# Patient Record
Sex: FEMALE
[De-identification: names, ages, dates, MRNs, and addresses within clinical notes are randomized per-mention and may not be internally consistent; named-entity substitution may affect disease eponyms.]

---

## 2023-03-31 ENCOUNTER — HOSPITAL ENCOUNTER (OUTPATIENT)
Age: 23
End: 2023-03-31
Payer: OTHER GOVERNMENT

## 2023-03-31 DIAGNOSIS — Z34.01: Primary | ICD-10-CM

## 2023-03-31 LAB
ADD MANUAL DIFF / SLIDE REVIEW: NO
HEMATOCRIT: 37.3 % (ref 36–46)
HEMOGLOBIN: 12.6 G/DL (ref 12–16)
LYMPHOCYTES # SPEC AUTO: 3500 /UL (ref 1100–4500)
MCV RBC: 88.5 FL (ref 80–100)
MEAN CORPUSCULAR HEMOGLOBIN: 29.9 PG (ref 26–34)
MEAN CORPUSCULAR HGB CONC: 33.7 % (ref 30–36)
PLATELET COUNT: 204 X10^3/UL (ref 150–400)

## 2023-03-31 PROCEDURE — 86850 RBC ANTIBODY SCREEN: CPT

## 2023-03-31 PROCEDURE — 86901 BLOOD TYPING SEROLOGIC RH(D): CPT

## 2023-03-31 PROCEDURE — 86787 VARICELLA-ZOSTER ANTIBODY: CPT

## 2023-03-31 PROCEDURE — 87086 URINE CULTURE/COLONY COUNT: CPT

## 2023-03-31 PROCEDURE — 36415 COLL VENOUS BLD VENIPUNCTURE: CPT

## 2023-03-31 PROCEDURE — 86803 HEPATITIS C AB TEST: CPT

## 2023-03-31 PROCEDURE — 86900 BLOOD TYPING SEROLOGIC ABO: CPT

## 2023-03-31 PROCEDURE — 87389 HIV-1 AG W/HIV-1&-2 AB AG IA: CPT

## 2023-04-03 LAB
HBV SURFACE AG SERPL QL IA: NEGATIVE S/C
HCV AB SERPL QL IA: NEGATIVE S/C
HIV 1+2 AB+HIV1 P24 AG SERPL QL IA: NEGATIVE

## 2023-05-01 ENCOUNTER — HOSPITAL ENCOUNTER (OUTPATIENT)
Age: 23
End: 2023-05-01
Payer: OTHER GOVERNMENT

## 2023-05-01 DIAGNOSIS — Z3A.15: ICD-10-CM

## 2023-05-01 DIAGNOSIS — Z34.02: Primary | ICD-10-CM

## 2023-05-01 LAB
C TRACH DNA UR QL NAA+PROBE: NOT DETECTED
N GONORRHOEA RRNA UR QL NAA+PROBE: NOT DETECTED

## 2023-05-01 PROCEDURE — 87491 CHLMYD TRACH DNA AMP PROBE: CPT

## 2023-05-01 PROCEDURE — 87591 N.GONORRHOEAE DNA AMP PROB: CPT

## 2023-05-31 ENCOUNTER — HOSPITAL ENCOUNTER (OUTPATIENT)
Age: 23
End: 2023-05-31
Payer: OTHER GOVERNMENT

## 2023-05-31 DIAGNOSIS — Z3A.19: ICD-10-CM

## 2023-05-31 DIAGNOSIS — Z34.02: Primary | ICD-10-CM

## 2023-05-31 PROCEDURE — 36415 COLL VENOUS BLD VENIPUNCTURE: CPT

## 2023-05-31 PROCEDURE — 82677 ASSAY OF ESTRIOL: CPT

## 2023-05-31 PROCEDURE — 84702 CHORIONIC GONADOTROPIN TEST: CPT

## 2023-05-31 PROCEDURE — 86336 INHIBIN A: CPT

## 2023-05-31 PROCEDURE — 82105 ALPHA-FETOPROTEIN SERUM: CPT

## 2023-06-02 LAB
AFP SERPL-MCNC: 37.4 NG/ML
GA METHOD: (no result)
HCG ADJ MOM SERPL: 1.23
HCG SERPL-ACNC: (no result) MIU/ML
INHIBIN A ADJ MOM SERPL: 1.56
INHIBIN A SERPL-MCNC: 209.15 PG/ML
U ESTRIOL SERPL-MCNC: 1.42 NG/ML

## 2023-06-06 ENCOUNTER — HOSPITAL ENCOUNTER (OUTPATIENT)
Age: 23
End: 2023-06-06
Payer: OTHER GOVERNMENT

## 2023-06-06 DIAGNOSIS — Z3A.20: ICD-10-CM

## 2023-06-06 DIAGNOSIS — Z34.02: Primary | ICD-10-CM

## 2023-06-06 PROCEDURE — 76811 OB US DETAILED SNGL FETUS: CPT

## 2023-06-25 ENCOUNTER — HOSPITAL ENCOUNTER (OUTPATIENT)
Dept: HOSPITAL 73 - LABOR | Age: 23
Discharge: HOME | End: 2023-06-25
Payer: OTHER GOVERNMENT

## 2023-06-25 DIAGNOSIS — O36.8120: Primary | ICD-10-CM

## 2023-06-25 DIAGNOSIS — Z3A.23: ICD-10-CM

## 2023-06-25 PROCEDURE — G0378 HOSPITAL OBSERVATION PER HR: HCPCS

## 2023-06-25 PROCEDURE — 59025 FETAL NON-STRESS TEST: CPT

## 2023-06-25 PROCEDURE — G0379 DIRECT REFER HOSPITAL OBSERV: HCPCS

## 2023-07-28 ENCOUNTER — HOSPITAL ENCOUNTER (OUTPATIENT)
Age: 23
End: 2023-07-28
Payer: OTHER GOVERNMENT

## 2023-07-28 DIAGNOSIS — Z3A.26: ICD-10-CM

## 2023-07-28 DIAGNOSIS — Z34.02: Primary | ICD-10-CM

## 2023-07-28 LAB
GTT (PREG) 1 HOUR PP 50GM DOSE: 116 MG/DL (ref 76–139)
HEMATOCRIT: 33.8 % (ref 36–46)
HEMOGLOBIN: 11.4 G/DL (ref 12–16)

## 2023-07-28 PROCEDURE — 36415 COLL VENOUS BLD VENIPUNCTURE: CPT

## 2023-07-28 PROCEDURE — 85014 HEMATOCRIT: CPT

## 2023-07-28 PROCEDURE — 82950 GLUCOSE TEST: CPT

## 2023-07-28 PROCEDURE — 85018 HEMOGLOBIN: CPT

## 2023-10-05 ENCOUNTER — HOSPITAL ENCOUNTER (OUTPATIENT)
Age: 23
End: 2023-10-05
Payer: OTHER GOVERNMENT

## 2023-10-05 DIAGNOSIS — Z34.03: Primary | ICD-10-CM

## 2023-10-05 PROCEDURE — 87653 STREP B DNA AMP PROBE: CPT

## 2023-10-06 LAB — GP B STREP DNA SPEC QL NAA+PROBE: (no result)

## 2023-10-15 ENCOUNTER — HOSPITAL ENCOUNTER (EMERGENCY)
Age: 23
Discharge: HOME | End: 2023-10-15
Payer: OTHER GOVERNMENT

## 2023-10-15 VITALS
HEART RATE: 106 BPM | DIASTOLIC BLOOD PRESSURE: 65 MMHG | RESPIRATION RATE: 18 BRPM | SYSTOLIC BLOOD PRESSURE: 126 MMHG | OXYGEN SATURATION: 99 %

## 2023-10-15 VITALS
OXYGEN SATURATION: 98 % | HEART RATE: 113 BPM | DIASTOLIC BLOOD PRESSURE: 83 MMHG | SYSTOLIC BLOOD PRESSURE: 135 MMHG | RESPIRATION RATE: 18 BRPM | TEMPERATURE: 98.2 F

## 2023-10-15 VITALS — BODY MASS INDEX: 43.3 KG/M2

## 2023-10-15 DIAGNOSIS — R09.A0: Primary | ICD-10-CM

## 2023-10-15 PROCEDURE — A9270 NON-COVERED ITEM OR SERVICE: HCPCS

## 2023-10-15 PROCEDURE — 99283 EMERGENCY DEPT VISIT LOW MDM: CPT

## 2023-10-19 ENCOUNTER — HOSPITAL ENCOUNTER (INPATIENT)
Age: 23
LOS: 4 days | Discharge: HOME | End: 2023-10-23
Payer: OTHER GOVERNMENT

## 2023-10-19 VITALS — BODY MASS INDEX: 43 KG/M2

## 2023-10-19 VITALS — SYSTOLIC BLOOD PRESSURE: 109 MMHG | DIASTOLIC BLOOD PRESSURE: 71 MMHG

## 2023-10-19 DIAGNOSIS — Z3A.39: ICD-10-CM

## 2023-10-19 DIAGNOSIS — T88.59XA: ICD-10-CM

## 2023-10-19 DIAGNOSIS — D62: ICD-10-CM

## 2023-10-19 DIAGNOSIS — O43.893: ICD-10-CM

## 2023-10-19 DIAGNOSIS — O41.03X0: ICD-10-CM

## 2023-10-19 DIAGNOSIS — G44.40: ICD-10-CM

## 2023-10-19 DIAGNOSIS — O61.0: Primary | ICD-10-CM

## 2023-10-19 LAB
ADD MANUAL DIFF / SLIDE REVIEW: NO
HEMATOCRIT: 34.6 % (ref 36–46)
HEMOGLOBIN: 11.5 G/DL (ref 12–16)
LYMPHOCYTES # SPEC AUTO: 2100 /UL (ref 1100–4500)
MCV RBC: 87.7 FL (ref 80–100)
MEAN CORPUSCULAR HEMOGLOBIN: 29.2 PG (ref 26–34)
MEAN CORPUSCULAR HGB CONC: 33.3 % (ref 30–36)
PLATELET COUNT: 175 X10^3/UL (ref 150–400)

## 2023-10-19 PROCEDURE — 86900 BLOOD TYPING SEROLOGIC ABO: CPT

## 2023-10-19 PROCEDURE — G0379 DIRECT REFER HOSPITAL OBSERV: HCPCS

## 2023-10-19 PROCEDURE — 85025 COMPLETE CBC W/AUTO DIFF WBC: CPT

## 2023-10-19 PROCEDURE — 62273 INJECT EPIDURAL PATCH: CPT

## 2023-10-19 PROCEDURE — 86850 RBC ANTIBODY SCREEN: CPT

## 2023-10-19 PROCEDURE — 59200 INSERT CERVICAL DILATOR: CPT

## 2023-10-19 PROCEDURE — 86901 BLOOD TYPING SEROLOGIC RH(D): CPT

## 2023-10-19 PROCEDURE — 59050 FETAL MONITOR W/REPORT: CPT

## 2023-10-19 PROCEDURE — 76815 OB US LIMITED FETUS(S): CPT

## 2023-10-19 PROCEDURE — 59510 CESAREAN DELIVERY: CPT

## 2023-10-19 PROCEDURE — 59514 CESAREAN DELIVERY ONLY: CPT

## 2023-10-19 PROCEDURE — 36415 COLL VENOUS BLD VENIPUNCTURE: CPT

## 2023-10-21 VITALS
DIASTOLIC BLOOD PRESSURE: 92 MMHG | RESPIRATION RATE: 22 BRPM | OXYGEN SATURATION: 97 % | HEART RATE: 105 BPM | SYSTOLIC BLOOD PRESSURE: 132 MMHG

## 2023-10-21 VITALS
SYSTOLIC BLOOD PRESSURE: 117 MMHG | DIASTOLIC BLOOD PRESSURE: 73 MMHG | RESPIRATION RATE: 22 BRPM | HEART RATE: 107 BPM | OXYGEN SATURATION: 97 %

## 2023-10-21 VITALS
DIASTOLIC BLOOD PRESSURE: 83 MMHG | HEART RATE: 96 BPM | RESPIRATION RATE: 20 BRPM | TEMPERATURE: 97.5 F | SYSTOLIC BLOOD PRESSURE: 136 MMHG | OXYGEN SATURATION: 98 %

## 2023-10-21 VITALS
OXYGEN SATURATION: 98 % | DIASTOLIC BLOOD PRESSURE: 81 MMHG | TEMPERATURE: 97.3 F | HEART RATE: 104 BPM | SYSTOLIC BLOOD PRESSURE: 122 MMHG | RESPIRATION RATE: 16 BRPM

## 2023-10-21 VITALS
SYSTOLIC BLOOD PRESSURE: 122 MMHG | DIASTOLIC BLOOD PRESSURE: 83 MMHG | OXYGEN SATURATION: 98 % | HEART RATE: 107 BPM | RESPIRATION RATE: 19 BRPM

## 2023-10-22 LAB
ADD MANUAL DIFF / SLIDE REVIEW: NO
HEMATOCRIT: 26.9 % (ref 36–46)
HEMOGLOBIN: 9.1 G/DL (ref 12–16)
LYMPHOCYTES # SPEC AUTO: 1200 /UL (ref 1100–4500)
MCV RBC: 87.8 FL (ref 80–100)
MEAN CORPUSCULAR HEMOGLOBIN: 29.6 PG (ref 26–34)
MEAN CORPUSCULAR HGB CONC: 33.7 % (ref 30–36)
PLATELET COUNT: 131 X10^3/UL (ref 150–400)

## 2023-10-23 VITALS
TEMPERATURE: 97.3 F | SYSTOLIC BLOOD PRESSURE: 105 MMHG | HEART RATE: 98 BPM | OXYGEN SATURATION: 97 % | DIASTOLIC BLOOD PRESSURE: 67 MMHG | RESPIRATION RATE: 26 BRPM

## 2023-10-23 PROCEDURE — 3E0R3GC INTRODUCTION OF OTHER THERAPEUTIC SUBSTANCE INTO SPINAL CANAL, PERCUTANEOUS APPROACH: ICD-10-PCS | Performed by: ANESTHESIOLOGY

## 2024-03-05 ENCOUNTER — HOSPITAL ENCOUNTER (INPATIENT)
Dept: HOSPITAL 73 - ED | Age: 24
LOS: 4 days | Discharge: ANOTHER HOSPITAL | DRG: 419 | End: 2024-03-09
Payer: OTHER GOVERNMENT

## 2024-03-05 VITALS
TEMPERATURE: 98.4 F | SYSTOLIC BLOOD PRESSURE: 127 MMHG | OXYGEN SATURATION: 97 % | RESPIRATION RATE: 18 BRPM | DIASTOLIC BLOOD PRESSURE: 91 MMHG | HEART RATE: 84 BPM

## 2024-03-05 VITALS — HEART RATE: 71 BPM

## 2024-03-05 VITALS
RESPIRATION RATE: 19 BRPM | HEART RATE: 71 BPM | DIASTOLIC BLOOD PRESSURE: 90 MMHG | OXYGEN SATURATION: 98 % | SYSTOLIC BLOOD PRESSURE: 139 MMHG

## 2024-03-05 VITALS — BODY MASS INDEX: 38.7 KG/M2 | BODY MASS INDEX: 39.6 KG/M2

## 2024-03-05 DIAGNOSIS — Z87.891: ICD-10-CM

## 2024-03-05 DIAGNOSIS — K80.20: Primary | ICD-10-CM

## 2024-03-05 LAB
ADD MANUAL DIFF / SLIDE REVIEW: NO
ALBUMIN SERPL-MCNC: 4.2 G/DL (ref 3.5–5)
ALBUMIN/GLOB SERPL: 1.2 {RATIO} (ref 1–2.8)
ALP SERPL-CCNC: 116 U/L (ref 38–126)
ALT SERPL-CCNC: 566 IU/L (ref ?–35)
BUN SERPL-MCNC: 11 MG/DL (ref 7–17)
CALCIUM SERPL-MCNC: 10 MG/DL (ref 8.4–10.2)
CHLORIDE SERPL-SCNC: 105 MMOL/L (ref 98–107)
CO2 SERPL-SCNC: 31 MMOL/L (ref 22–32)
ESTIMATED GLOMERULAR FILT RATE: > 60 ML/MIN (ref 60–?)
GLOBULIN SER CALC-MCNC: 3.5 G/DL (ref 1.7–4.1)
GLUCOSE SERPL-MCNC: 105 MG/DL (ref 70–100)
HEMATOCRIT: 40.5 % (ref 36–46)
HEMOGLOBIN: 13.4 G/DL (ref 12–16)
HEMOLYSIS: < 15 (ref 0–50)
LIPASE SERPL-CCNC: 183 U/L (ref 23–300)
LYMPHOCYTES # SPEC AUTO: 1500 /UL (ref 1100–4500)
MCV RBC: 86.3 FL (ref 80–100)
MEAN CORPUSCULAR HEMOGLOBIN: 28.5 PG (ref 26–34)
MEAN CORPUSCULAR HGB CONC: 33 % (ref 30–36)
PLATELET COUNT: 201 X10^3/UL (ref 150–400)
POTASSIUM SERPL-SCNC: 4 MMOL/L (ref 3.4–5.1)
PROT SERPL-MCNC: 7.7 G/DL (ref 6.3–8.2)
SODIUM SERPL-SCNC: 140 MMOL/L (ref 137–145)

## 2024-03-05 PROCEDURE — 83690 ASSAY OF LIPASE: CPT

## 2024-03-05 PROCEDURE — 74183 MRI ABD W/O CNTR FLWD CNTR: CPT

## 2024-03-05 PROCEDURE — 47563 LAPARO CHOLECYSTECTOMY/GRAPH: CPT

## 2024-03-05 PROCEDURE — 96374 THER/PROPH/DIAG INJ IV PUSH: CPT

## 2024-03-05 PROCEDURE — 99222 1ST HOSP IP/OBS MODERATE 55: CPT

## 2024-03-05 PROCEDURE — 36415 COLL VENOUS BLD VENIPUNCTURE: CPT

## 2024-03-05 PROCEDURE — 99284 EMERGENCY DEPT VISIT MOD MDM: CPT

## 2024-03-05 PROCEDURE — 81025 URINE PREGNANCY TEST: CPT

## 2024-03-05 PROCEDURE — 96375 TX/PRO/DX INJ NEW DRUG ADDON: CPT

## 2024-03-05 PROCEDURE — C9113 INJ PANTOPRAZOLE SODIUM, VIA: HCPCS

## 2024-03-05 PROCEDURE — 74300 X-RAY BILE DUCTS/PANCREAS: CPT

## 2024-03-05 PROCEDURE — G0378 HOSPITAL OBSERVATION PER HR: HCPCS

## 2024-03-05 PROCEDURE — 76705 ECHO EXAM OF ABDOMEN: CPT

## 2024-03-05 PROCEDURE — 80053 COMPREHEN METABOLIC PANEL: CPT

## 2024-03-05 PROCEDURE — 85025 COMPLETE CBC W/AUTO DIFF WBC: CPT

## 2024-03-05 PROCEDURE — 99285 EMERGENCY DEPT VISIT HI MDM: CPT

## 2024-03-05 RX ADMIN — PANTOPRAZOLE SODIUM 40 MG: 40 INJECTION, POWDER, FOR SOLUTION INTRAVENOUS at 21:38

## 2024-03-05 RX ADMIN — ONDANSETRON 4 MG: 2 INJECTION INTRAMUSCULAR; INTRAVENOUS at 21:38

## 2024-03-05 NOTE — DI.US.S_ITS
PROCEDURE:  US ABDOMEN LIMITED 
  
INDICATIONS:  RUQ ? gallstone 
  
TECHNIQUE:   
Real-time scanning was performed of the abdominal and retroperitoneal organs, with image  
documentation.   
  
COMPARISON:  None. 
  
FINDINGS:   
  
Liver:  Liver is normal in size .  Mildly increased liver parenchymal echotexture is  
seen.  No discrete hepatic lesion. 
  
Gallbladder:  Multiple stones are seen in dependent portion of gallbladder lumen.  No  
gallbladder wall thickening or pericholecystic fluid.  No sonographic Ibrahim's sign. 
  
Biliary ducts:  Intrahepatic bile ducts are non-dilated.  Extrahepatic bile duct caliber  
measures 6.9 mm.  Normal is 6-7 mm or less in diameter, or 10 mm or less  
post-cholecystectomy.   
  
Pancreas:  Visualized portions of the pancreas are sonographically normal.   
  
Miscellaneous:  No free abdominal fluid.   
  
  
IMPRESSION:   
  
1. Cholelithiasis without sonographic evidence of acute cholecystitis.  No biliary ductal  
dilatation. 
  
2. Mild hepatic steatosis.  No discrete hepatic lesion. 
  
  
  
Dictated by: Waqar Snyder M.D. on 3/05/2024 at 22:19      
Approved by: Waqar Snyder M.D. on 3/05/2024 at 22:20

## 2024-03-06 VITALS — OXYGEN SATURATION: 97 % | HEART RATE: 80 BPM | RESPIRATION RATE: 17 BRPM

## 2024-03-06 VITALS — HEART RATE: 77 BPM | OXYGEN SATURATION: 99 % | DIASTOLIC BLOOD PRESSURE: 63 MMHG | SYSTOLIC BLOOD PRESSURE: 99 MMHG

## 2024-03-06 VITALS
DIASTOLIC BLOOD PRESSURE: 70 MMHG | HEART RATE: 84 BPM | SYSTOLIC BLOOD PRESSURE: 126 MMHG | RESPIRATION RATE: 21 BRPM | OXYGEN SATURATION: 99 %

## 2024-03-06 VITALS
SYSTOLIC BLOOD PRESSURE: 141 MMHG | RESPIRATION RATE: 24 BRPM | DIASTOLIC BLOOD PRESSURE: 79 MMHG | HEART RATE: 85 BPM | OXYGEN SATURATION: 96 %

## 2024-03-06 VITALS
HEART RATE: 82 BPM | OXYGEN SATURATION: 96 % | DIASTOLIC BLOOD PRESSURE: 71 MMHG | SYSTOLIC BLOOD PRESSURE: 109 MMHG | RESPIRATION RATE: 18 BRPM | TEMPERATURE: 98.06 F

## 2024-03-06 VITALS — DIASTOLIC BLOOD PRESSURE: 64 MMHG | HEART RATE: 97 BPM | SYSTOLIC BLOOD PRESSURE: 111 MMHG | OXYGEN SATURATION: 98 %

## 2024-03-06 VITALS — OXYGEN SATURATION: 96 % | DIASTOLIC BLOOD PRESSURE: 71 MMHG | HEART RATE: 84 BPM | SYSTOLIC BLOOD PRESSURE: 123 MMHG

## 2024-03-06 VITALS — DIASTOLIC BLOOD PRESSURE: 59 MMHG | HEART RATE: 79 BPM | SYSTOLIC BLOOD PRESSURE: 109 MMHG | OXYGEN SATURATION: 95 %

## 2024-03-06 VITALS — OXYGEN SATURATION: 97 % | HEART RATE: 79 BPM

## 2024-03-06 VITALS
RESPIRATION RATE: 17 BRPM | HEART RATE: 84 BPM | OXYGEN SATURATION: 98 % | TEMPERATURE: 97.16 F | SYSTOLIC BLOOD PRESSURE: 117 MMHG | DIASTOLIC BLOOD PRESSURE: 67 MMHG

## 2024-03-06 VITALS
HEART RATE: 81 BPM | SYSTOLIC BLOOD PRESSURE: 161 MMHG | RESPIRATION RATE: 25 BRPM | DIASTOLIC BLOOD PRESSURE: 101 MMHG | OXYGEN SATURATION: 100 %

## 2024-03-06 VITALS — HEART RATE: 81 BPM | OXYGEN SATURATION: 97 %

## 2024-03-06 VITALS
RESPIRATION RATE: 26 BRPM | OXYGEN SATURATION: 98 % | SYSTOLIC BLOOD PRESSURE: 160 MMHG | DIASTOLIC BLOOD PRESSURE: 75 MMHG | HEART RATE: 81 BPM

## 2024-03-06 VITALS — OXYGEN SATURATION: 99 % | HEART RATE: 76 BPM

## 2024-03-06 VITALS — HEART RATE: 88 BPM | OXYGEN SATURATION: 96 %

## 2024-03-06 VITALS — SYSTOLIC BLOOD PRESSURE: 102 MMHG | OXYGEN SATURATION: 95 % | HEART RATE: 78 BPM | DIASTOLIC BLOOD PRESSURE: 57 MMHG

## 2024-03-06 VITALS — OXYGEN SATURATION: 97 % | HEART RATE: 84 BPM

## 2024-03-06 VITALS — RESPIRATION RATE: 15 BRPM | OXYGEN SATURATION: 97 % | HEART RATE: 85 BPM

## 2024-03-06 VITALS — HEART RATE: 86 BPM | OXYGEN SATURATION: 97 %

## 2024-03-06 VITALS
TEMPERATURE: 97.6 F | OXYGEN SATURATION: 97 % | DIASTOLIC BLOOD PRESSURE: 82 MMHG | HEART RATE: 84 BPM | RESPIRATION RATE: 18 BRPM | SYSTOLIC BLOOD PRESSURE: 118 MMHG

## 2024-03-06 VITALS — OXYGEN SATURATION: 97 % | RESPIRATION RATE: 14 BRPM | HEART RATE: 80 BPM

## 2024-03-06 VITALS — SYSTOLIC BLOOD PRESSURE: 109 MMHG | HEART RATE: 109 BPM | OXYGEN SATURATION: 98 % | DIASTOLIC BLOOD PRESSURE: 71 MMHG

## 2024-03-06 VITALS — OXYGEN SATURATION: 98 % | HEART RATE: 76 BPM

## 2024-03-06 VITALS — RESPIRATION RATE: 18 BRPM | HEART RATE: 82 BPM | OXYGEN SATURATION: 95 %

## 2024-03-06 VITALS
RESPIRATION RATE: 19 BRPM | HEART RATE: 83 BPM | SYSTOLIC BLOOD PRESSURE: 147 MMHG | DIASTOLIC BLOOD PRESSURE: 67 MMHG | OXYGEN SATURATION: 97 %

## 2024-03-06 VITALS — OXYGEN SATURATION: 95 % | HEART RATE: 75 BPM

## 2024-03-06 VITALS — HEART RATE: 79 BPM | SYSTOLIC BLOOD PRESSURE: 102 MMHG | DIASTOLIC BLOOD PRESSURE: 56 MMHG

## 2024-03-06 VITALS — HEART RATE: 71 BPM | OXYGEN SATURATION: 99 %

## 2024-03-06 VITALS — OXYGEN SATURATION: 98 %

## 2024-03-06 LAB
ADD MANUAL DIFF / SLIDE REVIEW: NO
ALBUMIN SERPL-MCNC: 3.5 G/DL (ref 3.5–5)
ALBUMIN SERPL-MCNC: 3.8 G/DL (ref 3.5–5)
ALBUMIN/GLOB SERPL: 1.1 {RATIO} (ref 1–2.8)
ALBUMIN/GLOB SERPL: 1.2 {RATIO} (ref 1–2.8)
ALP SERPL-CCNC: 114 U/L (ref 38–126)
ALP SERPL-CCNC: 139 U/L (ref 38–126)
ALT SERPL-CCNC: 562 IU/L (ref ?–35)
ALT SERPL-CCNC: 576 IU/L (ref ?–35)
BUN SERPL-MCNC: 7 MG/DL (ref 7–17)
BUN SERPL-MCNC: 8 MG/DL (ref 7–17)
CALCIUM SERPL-MCNC: 8.4 MG/DL (ref 8.4–10.2)
CALCIUM SERPL-MCNC: 8.5 MG/DL (ref 8.4–10.2)
CHLORIDE SERPL-SCNC: 108 MMOL/L (ref 98–107)
CHLORIDE SERPL-SCNC: 110 MMOL/L (ref 98–107)
CO2 SERPL-SCNC: 26 MMOL/L (ref 22–32)
CO2 SERPL-SCNC: 30 MMOL/L (ref 22–32)
ESTIMATED GLOMERULAR FILT RATE: > 60 ML/MIN (ref 60–?)
ESTIMATED GLOMERULAR FILT RATE: > 60 ML/MIN (ref 60–?)
GLOBULIN SER CALC-MCNC: 3.1 G/DL (ref 1.7–4.1)
GLOBULIN SER CALC-MCNC: 3.2 G/DL (ref 1.7–4.1)
GLUCOSE SERPL-MCNC: 87 MG/DL (ref 70–100)
GLUCOSE SERPL-MCNC: 99 MG/DL (ref 70–100)
HEMATOCRIT: 36.7 % (ref 36–46)
HEMOGLOBIN: 12 G/DL (ref 12–16)
HEMOLYSIS: < 15 (ref 0–50)
HEMOLYSIS: < 15 (ref 0–50)
LYMPHOCYTES # SPEC AUTO: 2400 /UL (ref 1100–4500)
MCV RBC: 86.6 FL (ref 80–100)
MEAN CORPUSCULAR HEMOGLOBIN: 28.3 PG (ref 26–34)
MEAN CORPUSCULAR HGB CONC: 32.7 % (ref 30–36)
PLATELET COUNT: 175 X10^3/UL (ref 150–400)
POTASSIUM SERPL-SCNC: 4 MMOL/L (ref 3.4–5.1)
POTASSIUM SERPL-SCNC: 4.1 MMOL/L (ref 3.4–5.1)
PROT SERPL-MCNC: 6.6 G/DL (ref 6.3–8.2)
PROT SERPL-MCNC: 7 G/DL (ref 6.3–8.2)
SODIUM SERPL-SCNC: 139 MMOL/L (ref 137–145)
SODIUM SERPL-SCNC: 141 MMOL/L (ref 137–145)

## 2024-03-06 RX ADMIN — DOCUSATE SODIUM 200 MG: 100 CAPSULE, LIQUID FILLED ORAL at 20:42

## 2024-03-06 RX ADMIN — Medication 650 MG: at 20:48

## 2024-03-06 RX ADMIN — SODIUM CHLORIDE 150 ML: 0.9 INJECTION, SOLUTION INTRAVENOUS at 02:34

## 2024-03-06 RX ADMIN — SODIUM CHLORIDE 1000 ML: 0.9 INJECTION, SOLUTION INTRAVENOUS at 00:17

## 2024-03-06 RX ADMIN — IBUPROFEN 600 MG: 600 TABLET, FILM COATED ORAL at 18:01

## 2024-03-06 RX ADMIN — SODIUM CHLORIDE, SODIUM LACTATE, POTASSIUM CHLORIDE, AND CALCIUM CHLORIDE 100 ML: .6; .31; .03; .02 INJECTION, SOLUTION INTRAVENOUS at 16:58

## 2024-03-06 RX ADMIN — HYDROMORPHONE HYDROCHLORIDE 1 MG: 1 INJECTION, SOLUTION INTRAMUSCULAR; INTRAVENOUS; SUBCUTANEOUS at 00:35

## 2024-03-06 NOTE — DI.MRI.S_ITS
PROCEDURE:  MR ABDOMEN WO/W CON 
  
INDICATIONS:  + Gallstones and increased LFTs. ? CBD stone 
  
TECHNIQUE:   
Coronal HASTE, axial 2D FLASH in- and out-of-phase; axial breath-hold T2 FSE.  Dynamic  
axial VIBE during the administration of contrast; post-contrast coronal VIBE or 2D FLASH  
with fat saturation from the hepatic dome to the iliac crests.  Optional diffusion  
weighted imaging and ADC may be performed.   
  
COMPARISON:  [facility], , US ABDOMEN LIMITED, 3/05/2024, 21:16. 
  
FINDINGS:   
Image quality:  Diagnostic 
  
Lower chest:  Unremarkable.  Lung bases not well evaluated on this study. 
  
Liver:  Mild-to-moderate steatosis.  No suspicious focal liver lesion. 
Gallbladder and biliary system:  Cholelithiasis.  Possible small filling defects are seen  
in the CBD (for example 6/16, and 9/31.  The CBD is borderline distended at 6 mm.  
Pancreas:  No ductal dilation.  Normal parenchyma on MRI. 
Spleen:  Nonenlarged 
Adrenals:  No discrete nodules 
Kidneys:  No solid mass or hydronephrosis 
  
Vessels and lymph nodes:  Prominent upper abdominal lymph nodes may be reactive in the  
setting of possible liver disease.  These are nonspecific.  No enlarged lymph nodes by  
size criteria.  No abdominal aortic aneurysm. 
  
Bowel and peritoneum:  No evidence of pathologic ascites or small bowel obstruction. 
  
Body wall:  Unremarkable 
  
Pelvis:  Not imaged on this study 
  
Bones:  No acute or suspicious osseous finding. 
  
  
  
  
IMPRESSION:   
  
Cholelithiasis.  Possible small CBD filling defects representing choledocholithiasis.   
The CBD is borderline distended at 6 mm. 
  
Mild-to-moderate hepatic steatosis. 
  
Other findings as above.   
  
  
  
  
Dictated by: Junior Lopez M.D. on 3/06/2024 at 10:41      
Approved by: Junior Lopez M.D. on 3/06/2024 at 10:45

## 2024-03-06 NOTE — PC.NURSE
Addendum entered by Abbi Linn CNA  03/06/24 13:49: 

1253: [facility], spoke to Simona, patient on wait list

Original Note:

Hospital call list for patient transfer

1144: [facility], spoke to Cristine, called GI Dr. Mora Thompson, patient is on wait list
1148: [facility], spoke to Charlotte, patient on wait list
         [facility] called back at 1202 to state that decline due to being a capacity
1153: [facility], spoke to Lacy, patient on wait list

## 2024-03-06 NOTE — ED_ITS
"HPI - Chest Pain    
<Tracy Polanco MD - Last Filed: 24 03:39>    
General    
Chief Complaint: Chest Pain    
Stated Complaint: sternum pain, no known injury    
Time Seen by Provider: 24 20:36    
Source: patient    
Mode of arrival: Ambulatory    
Limitations: no limitations    
Related Data    
                                Home Medications    
    
    
    
 Medication  Instructions  Recorded  Confirmed    
     
prenat.vits,marco antonio,min-iron-folic 1 tab PO DAILY 23    
    
    
                                  Previous Rx's    
    
    
    
 Medication  Instructions  Recorded    
     
breast pump #1 ea 23    
    
    
    
                                    Allergies    
    
    
    
Allergy/AdvReac Type Severity Reaction Status Date / Time    
     
No Known Drug Allergies Allergy   Verified 23 14:01    
    
    
    
    
<Sal Guzman MD - Last Filed: 24 17:55>    
History of Present Illness    
HPI narrative:     
Patient here for abdominal pain that started at 6:00 a.m..  Epigastric.  Of   
recently has had postprandial abdominal pain.  Variable foods with trigger it.    
No black or bloody stools.  Pain does not radiate.  Pain is sharp.    
    
Review of Systems    
<Sal Guzman MD - Last Filed: 24 17:55>    
Review of Systems    
Narrative:     
GENERAL: negative chills, fatigue, malaise, fever, sweats.     
HEENT: negative sinus pain, ear pain, sore throat    
RESPIRATORY: negative dyspnea, cough    
CARDIOVASCULAR: negative chest pain, palpitations     
GASTROINTESTINAL:  Positive abdominal pain    
: negative dysuria, frequency, hematuria    
MUSCULOSKELETAL: negative muscle or bony pain    
SKIN: negative rash, skin lesions    
NEUROLOGIC: negative weakness, numbness    
    
    
ROS Unobtainable: All systems reviewed & are unremarkable except as noted in HPI  
and below    
    
Patient History    
<Tracy Polanco MD - Last Filed: 24 03:39>    
Medical History (Reviewed 24 @ 08:52 by Sal Guzman MD)    
    
Anxiety (~)    
Ankle pain (~)    
Chlamydia (~)    
Depression (~)    
Ankle fracture, right (~)    
    
    
Surgical History (Reviewed 24 @ 08:52 by Sal Guzman MD)    
    
Anesthesia    
[place] teeth extracted (~2021)    
History of ankle surgery (~2020)    
    
    
Family History (Reviewed 24 @ 08:52 by Sal Guzman MD)    
Mother   Diabetes mellitus    
   Hyperlipidemia    
   Depression    
   Hypertension    
Sister   Ovarian cyst    
   Depression    
   Anxiety    
Father   Family estrangement    
Grandmother   Hypertension    
   Hyperlipidemia    
   Mental health problem    
    
    
Social History (Reviewed 24 @ 08:52 by Sal Guzman MD)    
marital status:       
number of children:  0     
household members:  spouse and family     
lives independently:  Yes     
caregiver/support person:  No     
housing:  apartment     
pets and animals:  Yes (dogs)     
education level:  high school     
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  
    
current occupational exposures/hazards:  No     
special gary needs:  No     
travel history:  over 6 months ago     
seatbelt use:  always     
helmet use:  No     
water heater temp set < 120 deg:  No     
working smoke detector in home:  Yes     
fire extinguisher in home:  Yes     
carbon monox detector in home:  Yes     
firearms in home:  Yes firearms unloaded and locked: Yes     
do you feel safe at home:  Yes     
Smoking Status:  Former smoker     
Tobacco: How many years used:  3     
second hand exposure:  No     
alcohol intake:  former     
substance use type:  does not use     
during the past year weight has:  increased > 10 lbs     
well-balanced diet:  rarely or never     
daily servings fruits/ve-1     
caffeine
[id number]|2024 00:00:00|2024 16:57:00|DI.RAD.S_ITS|[facility]|Imaging|[id number]|"PROCEDURE:  XR CHOLANGIOGRAM OPERATIVE

## 2024-03-07 VITALS
RESPIRATION RATE: 16 BRPM | TEMPERATURE: 97.7 F | SYSTOLIC BLOOD PRESSURE: 103 MMHG | HEART RATE: 79 BPM | OXYGEN SATURATION: 96 % | DIASTOLIC BLOOD PRESSURE: 69 MMHG

## 2024-03-07 VITALS
DIASTOLIC BLOOD PRESSURE: 79 MMHG | OXYGEN SATURATION: 96 % | RESPIRATION RATE: 18 BRPM | HEART RATE: 84 BPM | SYSTOLIC BLOOD PRESSURE: 123 MMHG | TEMPERATURE: 97.6 F

## 2024-03-07 LAB
ADD MANUAL DIFF / SLIDE REVIEW: NO
ALBUMIN SERPL-MCNC: 3.6 G/DL (ref 3.5–5)
ALBUMIN/GLOB SERPL: 1.1 {RATIO} (ref 1–2.8)
ALP SERPL-CCNC: 160 U/L (ref 38–126)
ALT SERPL-CCNC: 575 IU/L (ref ?–35)
BUN SERPL-MCNC: 5 MG/DL (ref 7–17)
CALCIUM SERPL-MCNC: 8.7 MG/DL (ref 8.4–10.2)
CHLORIDE SERPL-SCNC: 108 MMOL/L (ref 98–107)
CO2 SERPL-SCNC: 27 MMOL/L (ref 22–32)
ESTIMATED GLOMERULAR FILT RATE: > 60 ML/MIN (ref 60–?)
GLOBULIN SER CALC-MCNC: 3.2 G/DL (ref 1.7–4.1)
GLUCOSE SERPL-MCNC: 98 MG/DL (ref 70–100)
HEMATOCRIT: 37.6 % (ref 36–46)
HEMOGLOBIN: 12.4 G/DL (ref 12–16)
HEMOLYSIS: < 15 (ref 0–50)
LYMPHOCYTES # SPEC AUTO: 1700 /UL (ref 1100–4500)
MCV RBC: 87.4 FL (ref 80–100)
MEAN CORPUSCULAR HEMOGLOBIN: 28.8 PG (ref 26–34)
MEAN CORPUSCULAR HGB CONC: 32.9 % (ref 30–36)
PLATELET COUNT: 172 X10^3/UL (ref 150–400)
POTASSIUM SERPL-SCNC: 4 MMOL/L (ref 3.4–5.1)
PROT SERPL-MCNC: 6.8 G/DL (ref 6.3–8.2)
SODIUM SERPL-SCNC: 141 MMOL/L (ref 137–145)

## 2024-03-07 RX ADMIN — SODIUM CHLORIDE, SODIUM LACTATE, POTASSIUM CHLORIDE, AND CALCIUM CHLORIDE 100 ML: .6; .31; .03; .02 INJECTION, SOLUTION INTRAVENOUS at 03:09

## 2024-03-07 RX ADMIN — IBUPROFEN 600 MG: 600 TABLET, FILM COATED ORAL at 23:12

## 2024-03-07 RX ADMIN — MORPHINE SULFATE 2 MG: 2 INJECTION, SOLUTION INTRAMUSCULAR; INTRAVENOUS at 03:12

## 2024-03-07 RX ADMIN — SODIUM CHLORIDE, SODIUM LACTATE, POTASSIUM CHLORIDE, AND CALCIUM CHLORIDE 100 ML: .6; .31; .03; .02 INJECTION, SOLUTION INTRAVENOUS at 14:29

## 2024-03-07 NOTE — CM.DANOTE
Initial DCP Assessment Note

Pt is a 24 yo female, resident of [place], presents with abd pain. Surgery consulted and patient now on the schedule this afternoon for Brandie Esparza. 

PCP: Letitia Rascon   Payer:  [employer] 

Reviewed chart, pt discussed in multidisciplinary rounds this morning. Surgery scheduled this afternoon, patient expected to discharge either today or tomorrow, home w/family to assist during recovery.  

No barriers identified at this time to patient's safe discharge home w/family to assist; close outpatient f/u recommended. 

CM team will plan to follow closely in case any DC needs or concerns arise. 

CHANTELLE Jaimes


Discharge Planning/Care Management

CM Discharge Assessment                                    Start:  03/07/24 12:00
Freq:                                                      Status: Active        
Protocol:                                                                        
 Document     03/07/24 12:00  CHARLIE  (Rec: 03/07/24 12:05  CHARLIE  [id number])
 Discharge Planning Assessment
     Assigned Discharge Planner                  CHANTELLE Sawyer
     DPOA/Assigned Designee Name                 Sal Serrato, spouse
     Contact Information                         [phone]
     Advance Directives?                         No
     History Provided By                         Patient,Medical Record
     Household Members                           spouse,family
     Type of transporation used prior to         Drives own vehicle
      admit                                      
     Independent with ADL's                      Yes
     Is patient alert and oriented?              Yes
     Barriers to Discharge                       No
     Comment                                     Home w/family expected
     Discharge Plan                              Home
     Transportation Arrangement                  Family
     Referrals Initiated                         None needed

## 2024-03-08 VITALS
RESPIRATION RATE: 18 BRPM | SYSTOLIC BLOOD PRESSURE: 100 MMHG | DIASTOLIC BLOOD PRESSURE: 60 MMHG | OXYGEN SATURATION: 95 % | HEART RATE: 69 BPM

## 2024-03-08 VITALS
TEMPERATURE: 96.9 F | SYSTOLIC BLOOD PRESSURE: 106 MMHG | RESPIRATION RATE: 20 BRPM | OXYGEN SATURATION: 95 % | DIASTOLIC BLOOD PRESSURE: 57 MMHG | HEART RATE: 80 BPM

## 2024-03-08 VITALS — SYSTOLIC BLOOD PRESSURE: 115 MMHG | OXYGEN SATURATION: 94 % | HEART RATE: 78 BPM | DIASTOLIC BLOOD PRESSURE: 64 MMHG

## 2024-03-08 VITALS
HEART RATE: 65 BPM | RESPIRATION RATE: 21 BRPM | OXYGEN SATURATION: 93 % | SYSTOLIC BLOOD PRESSURE: 109 MMHG | DIASTOLIC BLOOD PRESSURE: 67 MMHG

## 2024-03-08 VITALS
DIASTOLIC BLOOD PRESSURE: 66 MMHG | RESPIRATION RATE: 18 BRPM | HEART RATE: 62 BPM | TEMPERATURE: 96.7 F | SYSTOLIC BLOOD PRESSURE: 110 MMHG | OXYGEN SATURATION: 97 %

## 2024-03-08 VITALS — OXYGEN SATURATION: 97 % | DIASTOLIC BLOOD PRESSURE: 64 MMHG | HEART RATE: 65 BPM | SYSTOLIC BLOOD PRESSURE: 116 MMHG

## 2024-03-08 VITALS
DIASTOLIC BLOOD PRESSURE: 73 MMHG | HEART RATE: 60 BPM | TEMPERATURE: 96.44 F | OXYGEN SATURATION: 96 % | SYSTOLIC BLOOD PRESSURE: 112 MMHG | RESPIRATION RATE: 20 BRPM

## 2024-03-08 VITALS
RESPIRATION RATE: 16 BRPM | SYSTOLIC BLOOD PRESSURE: 122 MMHG | OXYGEN SATURATION: 96 % | TEMPERATURE: 97.7 F | DIASTOLIC BLOOD PRESSURE: 69 MMHG | HEART RATE: 84 BPM

## 2024-03-08 VITALS
DIASTOLIC BLOOD PRESSURE: 51 MMHG | SYSTOLIC BLOOD PRESSURE: 93 MMHG | HEART RATE: 62 BPM | TEMPERATURE: 98.1 F | RESPIRATION RATE: 19 BRPM | OXYGEN SATURATION: 93 %

## 2024-03-08 VITALS — HEART RATE: 63 BPM | SYSTOLIC BLOOD PRESSURE: 106 MMHG | OXYGEN SATURATION: 96 % | DIASTOLIC BLOOD PRESSURE: 56 MMHG

## 2024-03-08 VITALS
OXYGEN SATURATION: 95 % | SYSTOLIC BLOOD PRESSURE: 93 MMHG | RESPIRATION RATE: 14 BRPM | DIASTOLIC BLOOD PRESSURE: 54 MMHG | HEART RATE: 71 BPM

## 2024-03-08 VITALS — DIASTOLIC BLOOD PRESSURE: 50 MMHG | HEART RATE: 77 BPM | SYSTOLIC BLOOD PRESSURE: 102 MMHG | OXYGEN SATURATION: 96 %

## 2024-03-08 VITALS
TEMPERATURE: 96.8 F | RESPIRATION RATE: 16 BRPM | SYSTOLIC BLOOD PRESSURE: 106 MMHG | DIASTOLIC BLOOD PRESSURE: 72 MMHG | OXYGEN SATURATION: 98 % | HEART RATE: 75 BPM

## 2024-03-08 LAB
ALBUMIN SERPL-MCNC: 3.4 G/DL (ref 3.5–5)
ALBUMIN/GLOB SERPL: 1.1 {RATIO} (ref 1–2.8)
ALP SERPL-CCNC: 151 U/L (ref 38–126)
ALT SERPL-CCNC: 458 IU/L (ref ?–35)
BUN SERPL-MCNC: 4 MG/DL (ref 7–17)
CALCIUM SERPL-MCNC: 8.9 MG/DL (ref 8.4–10.2)
CHLORIDE SERPL-SCNC: 108 MMOL/L (ref 98–107)
CO2 SERPL-SCNC: 27 MMOL/L (ref 22–32)
ESTIMATED GLOMERULAR FILT RATE: > 60 ML/MIN (ref 60–?)
GLOBULIN SER CALC-MCNC: 3.2 G/DL (ref 1.7–4.1)
GLUCOSE SERPL-MCNC: 85 MG/DL (ref 70–100)
HEMOLYSIS: < 15 (ref 0–50)
POTASSIUM SERPL-SCNC: 3.7 MMOL/L (ref 3.4–5.1)
PROT SERPL-MCNC: 6.6 G/DL (ref 6.3–8.2)
SODIUM SERPL-SCNC: 138 MMOL/L (ref 137–145)

## 2024-03-08 PROCEDURE — 0FT44ZZ RESECTION OF GALLBLADDER, PERCUTANEOUS ENDOSCOPIC APPROACH: ICD-10-PCS | Performed by: SURGERY

## 2024-03-08 RX ADMIN — BUPIVACAINE HYDROCHLORIDE 30 ML: 2.5 INJECTION, SOLUTION EPIDURAL; INFILTRATION; INTRACAUDAL; PERINEURAL at 12:27

## 2024-03-08 RX ADMIN — HYDROMORPHONE HYDROCHLORIDE 0.5 MG: 1 INJECTION, SOLUTION INTRAMUSCULAR; INTRAVENOUS; SUBCUTANEOUS at 14:48

## 2024-03-08 RX ADMIN — SODIUM CHLORIDE, SODIUM LACTATE, POTASSIUM CHLORIDE, AND CALCIUM CHLORIDE 100 ML: .6; .31; .03; .02 INJECTION, SOLUTION INTRAVENOUS at 08:02

## 2024-03-08 RX ADMIN — HYDROCODONE BITARTRATE AND ACETAMINOPHEN 1 TAB: 5; 325 TABLET ORAL at 16:26

## 2024-03-08 RX ADMIN — ONDANSETRON 4 MG: 2 INJECTION INTRAMUSCULAR; INTRAVENOUS at 14:55

## 2024-03-08 RX ADMIN — HYDROMORPHONE HYDROCHLORIDE 0.5 MG: 1 INJECTION, SOLUTION INTRAMUSCULAR; INTRAVENOUS; SUBCUTANEOUS at 23:04

## 2024-03-08 RX ADMIN — Medication 650 MG: at 23:05

## 2024-03-08 RX ADMIN — PIPERACILLIN SODIUM AND TAZOBACTAM SODIUM 25 GM: 3; .375 INJECTION, POWDER, LYOPHILIZED, FOR SOLUTION INTRAVENOUS at 12:17

## 2024-03-08 RX ADMIN — SODIUM CHLORIDE, SODIUM LACTATE, POTASSIUM CHLORIDE, AND CALCIUM CHLORIDE 42 ML: .6; .31; .03; .02 INJECTION, SOLUTION INTRAVENOUS at 11:39

## 2024-03-08 RX ADMIN — ACETAMINOPHEN 400 MG: 10 INJECTION, SOLUTION INTRAVENOUS at 13:00

## 2024-03-08 RX ADMIN — HYDROCODONE BITARTRATE AND ACETAMINOPHEN 1 TAB: 5; 325 TABLET ORAL at 20:13

## 2024-03-08 RX ADMIN — SCOPALAMINE 1 PATCH: 1 PATCH, EXTENDED RELEASE TRANSDERMAL at 11:38

## 2024-03-08 RX ADMIN — IBUPROFEN 600 MG: 600 TABLET, FILM COATED ORAL at 23:06

## 2024-03-08 NOTE — PM.OP.1
Operative Date/Time/Diagnoses
Date of procedure: 03/08/24
Time of procedure: 13:54
Pre-op diagnosis: Choledocholithiasis
Post-op diagnosis: same

Procedure & Clinicians
Procedure: 
Laparoscopic cholecystectomy
Intraoperative cholangiogram
Same procedure as scheduled: Yes
Indications: 
23-year-old woman who presented with choledocholithiasis taken to the operating room for laparoscopic cholecystectomy.
Surgeon: Yang Heller
Click Yes if Unassisted: Yes
Anesthesia Type: General
Operative Notes
Findings: 
Critical view of safety established
Intra operative cholangiogram demonstrates normal hepatic filling however there is no flow of contrast to the distal common bile duct or filling of the intestine consistent with choledocholithiasis.
Specimen(s): other (Gallbladder)
Estimated Blood Loss (mL): 50
Procedure in detail: 
The patient was placed supine on the table and bilateral lower extremity compression devices were applied.  Anesthesia was induced they were intubated with an endotracheal tube and received 2g of Ancef.  A time-out was performed. They were prepped 
and draped in sterile fashion.  An infraumbilical incision was made.  The fascia was elevated incised and the abdomen was entered atraumatically.   A blunt tip 12mm balloon trocar was then inserted, pneumoperitoneum was established and inspection of 
the abdomen demonstrated no evidence of injury. They were placed head up and right side up and then a 11 mm port was placed high in the epigastrium and two 5mm in the right upper quadrant.  The gallbladder was tensely distended and it was 
percutaneously drained of facilitate its manipulation.  The gallbladder was grasped by the fundus and retracted over the liver and retracted laterally by the infundibulum.   Using electrocautery the lateral plane between the gallbladder and the 
liver was opened towards the fundus. The gallbladder was then retracted laterally and the medial plane was developed in the same manner.  With the gallbladder mobilized the bottom of the cystic plate was visualized.  The hepatocystic triangle was 
meticulosly skeletonized with blunt dissection of fat and fibrous tissue from both the front and the back.  Only two structures were then clearly seen entering the gallbladder the cystic duct and the cystic artery.  With the critical view of safety 
established a nick on the cystic duct was made and then the cholangiogram catheter was inserted.  We flushed the cystic duct copiously with saline which returned numerous fragments of small stone debris.  The cholangiogram was performed and this 
demonstrated normal hepatic filling normal proximal common bile duct but no distal flow into the duodenum consistent with choledocholithiasis.  The cystic duct was clipped twice using the 10 mm Weck hemoclip applied under direct visualization.  The 
cystic artery was divided in the same fashion.  The gallbladder was removed from the liver bed using electro cautery.  The liver bed was then inspected for hemostasis and this was achieved.  The abdomen was irrigated with sterile saline and 
inspection was made that showed the clips in good position.  The specimen was removed using Endo-Catch.  The abdomen was desufflated.  The umbilical fascia was closed with 0 Vicryl in a figure-of-eight fashion under direct visualization.  Skin 
incisions were irrigated and closed with 4-0 Monocryl.  30 ml of 0.25% bupivacaine was infiltrated into the subcutaneous tissue of the incisions.  The wounds were sealed with Dermabond.  Patient emerged from anesthesia was extubated and transferred 
to recovery in stable condition.  The sponge and instrument count at the end of the operation was correct.
Complications: none
Post-operative
Condition: stable
Plan for aftercare: 
Requires ERCP for choledocholithiasis

## 2024-03-08 NOTE — PM.CALLCOV.1
Call Coverage Note
Note
Date of Patient Contact: 03/08/24
Time of Patient Contact: 18:49
Narrative of Care Provided: 
23F choledocholithiasis.  Laparoscopic cholecystectomy today with intraoperative cholangiogram demonstrates persistent choledocholithiasis.  Transfer has been arranged to [facility] for ERCP 3/9.
-regular diet
-NPO after midnight

## 2024-03-08 NOTE — P.OP_ITS
"Operative Date/Time/Diagnoses    
Date of procedure: 03/08/24    
Time of procedure: 13:54    
Pre-op diagnosis: Choledocholithiasis    
Post-op diagnosis: same    
    
Procedure & Clinicians    
Procedure:     
Laparoscopic cholecystectomy    
Intraoperative cholangiogram    
Same procedure as scheduled: Yes    
Indications:     
23-year-old woman who presented with choledocholithiasis taken to the operating   
room for laparoscopic cholecystectomy.    
Surgeon: Yang Heller    
Click Yes if Unassisted: Yes    
Anesthesia Type: General    
Operative Notes    
Findings:     
Critical view of safety established    
Intra operative cholangiogram demonstrates normal hepatic filling however there   
is no flow of contrast to the distal common bile duct or filling of the   
intestine consistent with choledocholithiasis.    
Specimen(s): other (Gallbladder)    
Estimated Blood Loss (mL): 50    
Procedure in detail:     
The patient was placed supine on the table and bilateral lower extremity com  
pression devices were applied.  Anesthesia was induced they were intubated with   
an endotracheal tube and received 2g of Ancef.  A time-out was performed. They   
were prepped and draped in sterile fashion.  An infraumbilical incision was   
made.  The fascia was elevated incised and the abdomen was entered   
atraumatically.   A blunt tip 12mm balloon trocar was then inserted,   
pneumoperitoneum was established and inspection of the abdomen demonstrated no   
evidence of injury. They were placed head up and right side up and then a 11 mm   
port was placed high in the epigastrium and two 5mm in the right upper quadrant.  
 The gallbladder was tensely distended and it was percutaneously drained of   
facilitate its manipulation.  The gallbladder was grasped by the fundus and   
retracted over the liver and retracted laterally by the infundibulum.   Using   
electrocautery the lateral plane between the gallbladder and the liver was   
opened towards the fundus. The gallbladder was then retracted laterally and the   
medial plane was developed in the same manner.  With the gallbladder mobilized   
the bottom of the cystic plate was visualized.  The hepatocystic triangle was   
meticulosly skeletonized with blunt dissection of fat and fibrous tissue from   
both the front and the back.  Only two structures were then clearly seen   
entering the gallbladder the cystic duct and the cystic artery.  With the   
critical view of safety established a nick on the cystic duct was made and then   
the cholangiogram catheter was inserted.  We flushed the cystic duct copiously   
with saline which returned numerous fragments of small stone debris.  The   
cholangiogram was performed and this demonstrated normal hepatic filling normal   
proximal common bile duct but no distal flow into the duodenum consistent with   
choledocholithiasis.  The cystic duct was clipped twice using the 10 mm Weck   
hemoclip applied under direct visualization.  The cystic artery was divided in   
the same fashion.  The gallbladder was removed from the liver bed using electro   
cautery.  The liver bed was then inspected for hemostasis and this was achieved.  
 The abdomen was irrigated with sterile saline and inspection was made that   
showed the clips in good position.  The specimen was removed using Endo-Catch.    
The abdomen was desufflated.  The umbilical fascia was closed with 0 Vicryl in a  
figure-of-eight fashion under direct visualization.  Skin incisions were irr  
igated and closed with 4-0 Monocryl.  30 ml of 0.25% bupivacaine was infiltrated  
into the subcutaneous tissue of the incisions.  The wounds were sealed with   
Dermabond.  Patient emerged from anesthesia was extubated and transferred to   
recovery in stable condition.  The sponge and instrument count at the end of the  
operation was correct.    
Complications: none    
Post-operative    
Condition: stable    
Plan for aftercar
[id number]|2024-03-06 16:36:12|2024-03-06 16:36:12|PM.HP.1||||"History of Present Illness

## 2024-03-08 NOTE — PM.CALLCOV.1
Call Coverage Note
Note
Date of Patient Contact: 03/08/24
Time of Patient Contact: 11:55
Narrative of Care Provided: 
23-year-old woman admitted with choledocholithiasis.  MRI demonstrates likely small stone within the common bile duct total bilirubin today remains elevated at 2.4 with mild transaminitis.  Discussed proceeding with laparoscopic cholecystectomy with 
cholangiogram if unable to relieve the biliary obstruction she will require transfer for ERCP.  Overview of the operation discussed.  Operative risks including hemorrhage, infection, damage to surrounding structures discussed.  Her questions have 
been answered and she is in agreement with this plan.  She provides her written and verbal consent to proceed.

## 2024-03-08 NOTE — SUR.OPER
Supine on padded OR bed, head on pillow, right arm secured on padded arm board at <90 degrees abduction, left arm secured, padded and tucked by side legs uncrossed, footboard in place, safety belt at thigh, tape over blanket over lower legs.

## 2024-03-08 NOTE — PATH_ITS
[facility] Accession Number: [id number] 
No. of containers..01 Tissue 
.                                                                01 
Material submitted:                                        . 
gallbladder - GALLBLADDER 
.                                                                01 
********************************************************************** 
Diagnosis: 
GALLBLADDER: 
Chronic cholecystitis, cholelithiasis, and cholesterolosis. 
Negative for dysplasia and malignancy. 
MNT  2024 Local 
********************************************************************** 
.                                                                01 
Electronically signed:                                     . 
Carolina Zhao MD, Pathologist 
NPI- [id number] 
.                                                                01 
Gross description:                                         . 
The specimen  is received in formalin labeled with the patient's name, 
, and  gallbladder , and consists of an intact gallbladder measuring 
6.3 x 2.9 x 1.7 cm with an unremarkable external surface. The cystic duct 
margin is inked blue, and no pericystic lymph node is identified. The 
lumen contains multiple yellow bosselated calculi measuring up to 0.7 cm 
in greatest dimension grossly obstructing the cystic duct and admixed with 
a small amount of green  mucoid bile. The mucosa is green and velvety with 
numerous yellow areas of discoloration, and no polyps or lesions 
identified. The walls average 0.3 cm thick and representative sections to 
include the cystic duct margin and full thickness sections are submitted 
in cassette A1. ([id number]  [id number]) 
/RALPH  2024 Local 
.                                                                01 
Pathologist provided ICD-10: 
K80.11 
.                                                                01 
CPT                                                        . 
273116 
Specimen Comment: A courtesy copy of this report has been sent to [phone] 
***Performed at:  01 
   [facility] 
   [address] [place] [address] [address] [facility], [place], [place]  [id number] 
   MD Daniel Toweill MD Phone:  [phone]

## 2024-03-08 NOTE — P.CALLCOV_ITS
Call Coverage Note    
Note    
Date of Patient Contact: 03/08/24    
Time of Patient Contact: 18:49    
Narrative of Care Provided:     
23F choledocholithiasis.  Laparoscopic cholecystectomy today with intraoperative  
cholangiogram demonstrates persistent choledocholithiasis.  Transfer has been   
arranged to [facility] for ERCP 3/9.    
-regular diet    
-NPO after midnight

## 2024-03-09 VITALS
TEMPERATURE: 97.1 F | OXYGEN SATURATION: 96 % | RESPIRATION RATE: 18 BRPM | DIASTOLIC BLOOD PRESSURE: 83 MMHG | SYSTOLIC BLOOD PRESSURE: 115 MMHG | HEART RATE: 85 BPM

## 2024-03-09 VITALS
HEART RATE: 80 BPM | DIASTOLIC BLOOD PRESSURE: 66 MMHG | SYSTOLIC BLOOD PRESSURE: 103 MMHG | OXYGEN SATURATION: 97 % | RESPIRATION RATE: 16 BRPM

## 2024-03-09 VITALS
DIASTOLIC BLOOD PRESSURE: 68 MMHG | TEMPERATURE: 98.06 F | HEART RATE: 96 BPM | SYSTOLIC BLOOD PRESSURE: 108 MMHG | OXYGEN SATURATION: 97 % | RESPIRATION RATE: 16 BRPM

## 2024-03-09 VITALS
DIASTOLIC BLOOD PRESSURE: 63 MMHG | TEMPERATURE: 98.1 F | RESPIRATION RATE: 16 BRPM | OXYGEN SATURATION: 98 % | HEART RATE: 73 BPM | SYSTOLIC BLOOD PRESSURE: 109 MMHG

## 2024-03-09 VITALS
TEMPERATURE: 97.52 F | SYSTOLIC BLOOD PRESSURE: 119 MMHG | OXYGEN SATURATION: 96 % | RESPIRATION RATE: 16 BRPM | HEART RATE: 103 BPM | DIASTOLIC BLOOD PRESSURE: 68 MMHG

## 2024-03-09 LAB
ALBUMIN SERPL-MCNC: 3.6 G/DL (ref 3.5–5)
ALBUMIN/GLOB SERPL: 1.1 {RATIO} (ref 1–2.8)
ALP SERPL-CCNC: 153 U/L (ref 38–126)
ALT SERPL-CCNC: 513 IU/L (ref ?–35)
BUN SERPL-MCNC: 4 MG/DL (ref 7–17)
CALCIUM SERPL-MCNC: 9 MG/DL (ref 8.4–10.2)
CHLORIDE SERPL-SCNC: 108 MMOL/L (ref 98–107)
CO2 SERPL-SCNC: 27 MMOL/L (ref 22–32)
ESTIMATED GLOMERULAR FILT RATE: > 60 ML/MIN (ref 60–?)
GLOBULIN SER CALC-MCNC: 3.3 G/DL (ref 1.7–4.1)
GLUCOSE SERPL-MCNC: 104 MG/DL (ref 70–100)
HEMOLYSIS: < 15 (ref 0–50)
POTASSIUM SERPL-SCNC: 4 MMOL/L (ref 3.4–5.1)
PROT SERPL-MCNC: 6.9 G/DL (ref 6.3–8.2)
SODIUM SERPL-SCNC: 138 MMOL/L (ref 137–145)

## 2024-03-09 RX ADMIN — SODIUM CHLORIDE, SODIUM LACTATE, POTASSIUM CHLORIDE, AND CALCIUM CHLORIDE 42 ML: .6; .31; .03; .02 INJECTION, SOLUTION INTRAVENOUS at 01:03

## 2024-03-09 RX ADMIN — MORPHINE SULFATE 2 MG: 2 INJECTION, SOLUTION INTRAMUSCULAR; INTRAVENOUS at 09:50

## 2024-03-09 RX ADMIN — HYDROMORPHONE HYDROCHLORIDE 0.5 MG: 1 INJECTION, SOLUTION INTRAMUSCULAR; INTRAVENOUS; SUBCUTANEOUS at 06:19

## 2024-03-09 RX ADMIN — OXYCODONE HYDROCHLORIDE 5 MG: 5 TABLET ORAL at 16:26

## 2024-03-09 RX ADMIN — OXYCODONE HYDROCHLORIDE 5 MG: 5 TABLET ORAL at 20:32

## 2024-03-09 RX ADMIN — MORPHINE SULFATE 2 MG: 2 INJECTION, SOLUTION INTRAMUSCULAR; INTRAVENOUS at 13:05

## 2024-03-09 RX ADMIN — IBUPROFEN 600 MG: 600 TABLET, FILM COATED ORAL at 06:18

## 2024-03-09 RX ADMIN — HYDROCODONE BITARTRATE AND ACETAMINOPHEN 1 TAB: 5; 325 TABLET ORAL at 00:21

## 2024-03-09 RX ADMIN — Medication 650 MG: at 06:18

## 2024-03-09 RX ADMIN — HYDROMORPHONE HYDROCHLORIDE 0.5 MG: 1 INJECTION, SOLUTION INTRAMUSCULAR; INTRAVENOUS; SUBCUTANEOUS at 01:56

## 2024-03-09 NOTE — P.PN_ITS
Subjective    
Subjective    
Date Patient Seen: 24    
Time Patient Seen: 13:47    
Interval history:     
No complaints today    
Awaiting transfer for ERCP    
    
Exam    
Vital Signs    
(past 8 hours):     
                                        -    
    
    
    
 24    
08:00    
     
Temperature 98.1 F    
     
Pulse Rate 96 H    
     
Respiratory Rate 16    
     
Blood Pressure 108/68    
     
Pulse Oximetry 97    
     
Oxygen Flow Rate 0    
    
    
                                            
    
    
    
Oxygen Delivery Method         Room Air                                           
    
     
Oxygen Flow Rate               0                                                  
    
    
    
    
    
Const    
General: No acute distress    
    
Objective    
Labs    
    
                                                        24 04:50              
    
                                                        24 03:40              
    
Labs:     
                         Laboratory Results - last 24 hr    
    
    
    
  24    
    
  03:40    
     
Sodium  138    
     
Potassium  4.0    
     
Chloride  108 H    
     
Carbon Dioxide  27    
     
BUN  4 L    
     
Creatinine  0.52    
     
Estimated GFR  > 60    
     
BUN/Creatinine Ratio  7.7    
     
Glucose  104 H    
     
Calcium  9.0    
     
Total Bilirubin  3.0 H    
     
AST  282 H    
     
ALT  513 H    
     
Alkaline Phosphatase  153 H    
     
Total Protein  6.9    
     
Albumin  3.6    
     
Globulin  3.3    
     
Albumin/Globulin Ratio  1.1    
    
    
    
    
PFSH    
Medical History (Reviewed 24 @ 08:52 by Sal Guzman MD)    
    
Anxiety (~)    
Ankle pain (~)    
Chlamydia (~)    
Depression (~)    
Ankle fracture, right (~)    
    
    
Surgical History (Reviewed 24 @ 08:52 by Sal Guzman MD)    
    
Anesthesia    
[place] teeth extracted (~2021)    
History of ankle surgery (~2020)    
    
    
Family History (Reviewed 24 @ 08:52 by Sal Guzman MD)    
Mother   Diabetes mellitus    
   Hyperlipidemia    
   Depression    
   Hypertension    
Sister   Ovarian cyst    
   Depression    
   Anxiety    
Father   Family estrangement    
Grandmother   Hypertension    
   Hyperlipidemia    
   Mental health problem    
    
    
Social History (Reviewed 24 @ 08:52 by Sal Guzman MD)    
marital status:       
number of children:  0     
household members:  spouse and family     
lives independently:  Yes     
caregiver/support person:  No     
housing:  apartment     
pets and animals:  Yes (dogs)     
education level:  high school     
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  
    
current occupational exposures/hazards:  No     
special gary needs:  No     
travel history:  over 6 months ago     
seatbelt use:  always     
helmet use:  No     
water heater temp set < 120 deg:  No     
working smoke detector in home:  Yes     
fire extinguisher in home:  Yes     
carbon monox detector in home:  Yes     
firearms in home:  Yes firearms unloaded and locked: Yes     
do you feel safe at home:  Yes     
Smoking Status:  Former smoker     
Tobacco: How many years used:  3     
second hand exposure:  No     
alcohol intake:  former     
substance use type:  does not use     
during the past year weight has:  increased > 10 lbs     
well-balanced diet:  rarely or never     
daily servings fruits/ve-1     
caffeine:  Yes (aware of 200mg limit)     
Type(s) of exercise:  none     
    
    
    
Assessment & Plan    
Assessment and plan    
(1) Choledocholithiasis:     
      Status: Acute    
    
Plan    
Transfer for ERCP when bed available

## 2024-03-09 NOTE — PC.NURSE
Patient discharged at 21:20 to [facility] via BLS transport.   Report called to FAMILIA Bertrand at 21:25.  All items removed from room.  Patient alert and oriented and in stable condition upon discharge.

## 2024-03-09 NOTE — PM.DS.1
"History of Present Illness
History of Present Illness
Chief complaint: sternum pain, no known injury
Narrative: 
Kristen Serrato is a 23-year-old woman who presented to the emergency department last night complaining of epigastric abdominal pain.  An ultrasound showed cholelithiasis.  She had elevated liver enzymes and an MRCP was ordered which was suggestive but 
not definitive for choledocholithiasis.  A consultation with Gastroenterology at [facility] regarding ERCP was performed and the gastroenterologist reviewed the MRCP with their radiologist and determined that there was no evidence of 
choledocholithiasis.  She feels better now.

Discharge Providers
Provider
Date of admission: 
03/08/24 16:30

Discharge Date: 03/09/24
Primary care physician: 
FRANDY Castro

Discharge provider: 
Titi Gilmore MD


Summary
Hospital Course
Discharge Diagnosis: 
choledocholithiasis
Hospital Course: 
The patient was admitted for cholelithiasis.  Initially an MRCP was performed because of elevated liver enzymes.  The radiologist suspected choledocholithiasis and arrangements were made to transfer to  for ERCP however upon reviewing the imaging 
they did not believe the MRCP showed common duct stones.  She then went to the OR with Dr. Heller for a laparoscopic cholecystectomy with cholangiogram on 3/8/24.  No contrast flowed into the duodenum on cholangiogram so she was transferred for an 
ERCP to [place].

Exam
Vital Signs
(past 8 hours): 
-

 03/09/24
16:00 03/09/24
20:02
Temperature  97.1 F L
Pulse Rate 80 85
Respiratory Rate 16 18
Blood Pressure 103/66 115/83
Pulse Oximetry 97 96
Oxygen Flow Rate 0 0



Oxygen Delivery Method         Room Air                                          
Oxygen Flow Rate               0                                                 




Objective
Labs

03/07/24 04:50          

03/09/24 03:40          

Labs: 
Laboratory Results - last 24 hr

  03/09/24
  03:40
Sodium  138
Potassium  4.0
Chloride  108 H
Carbon Dioxide  27
BUN  4 L
Creatinine  0.52
Estimated GFR  > 60
BUN/Creatinine Ratio  7.7
Glucose  104 H
Calcium  9.0
Total Bilirubin  3.0 H
AST  282 H
ALT  513 H
Alkaline Phosphatase  153 H
Total Protein  6.9
Albumin  3.6
Globulin  3.3
Albumin/Globulin Ratio  1.1



[facility]
Medical History (Reviewed 03/06/24 @ 08:52 by Sal Guzman MD)

Anxiety (~2021)
Ankle pain (~2020)
Chlamydia (~2019)
Depression (~2021)
Ankle fracture, right (~2020)


Surgical History (Reviewed 03/06/24 @ 08:52 by Sal Guzman MD)

Anesthesia
[place] teeth extracted (~03/2021)
History of ankle surgery (~09/2020)


Family History (Reviewed 03/06/24 @ 08:52 by Sal Guzman MD)
Mother
 Diabetes mellitus
 Hyperlipidemia
 Depression
 Hypertension
Sister
 Ovarian cyst
 Depression
 Anxiety
Father
 Family estrangement
Grandmother
 Hypertension
 Hyperlipidemia
 Mental health problem


Social History (Reviewed 03/06/24 @ 08:52 by Sal Guzman MD)
marital status:   
number of children:  0 
household members:  spouse and family 
lives independently:  Yes 
caregiver/support person:  No 
housing:  apartment 
pets and animals:  Yes (dogs) 
education level:  high school 
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 
current occupational exposures/hazards:  No 
special gary needs:  No 
travel history:  over 6 months ago 
seatbelt use:  always 
helmet use:  No 
water heater temp set < 120 deg:  No 
working smoke detector in home:  Yes 
fire extinguisher in home:  Yes 
carbon monox detector in home:  Yes 
firearms in home:  Yes firearms unloaded and locked: Yes 
do you feel safe at home:  Yes 
Smoking Status:  Former smoker 
Tobacco: How many years used:  3 
second hand exposure:  No 
alcohol intake:  former 
substance use type:  does not use 
during the past year weight has:  increased > 10 lbs 
well-balanced diet:  rarely or never 
daily ser
[id number]|2024-03-06 00:23:15|2024-03-06 00:23:15|ED.CHESTPAIN||||"HPI - Chest Pain

## 2024-03-09 NOTE — CM.DPNOTE
DCP Note

MSW reviewed EMR. Per surgeon note/RN, pt scheduled to transfer to [facility] 3/9/24 for  Requires ERCP for choledocholithiasis . Per previous CM notes, anticipated no CM needs. 

Plan: anticipate transfer to [facility] tomorrow. No CM needs. CM team will follow as needed.

CHANTELLE Min

## 2024-03-09 NOTE — PM.PN.1
Subjective
Subjective
Date Patient Seen: 24
Time Patient Seen: 13:47
Interval history: 
No complaints today
Awaiting transfer for ERCP

Exam
Vital Signs
(past 8 hours): 
-

 24
08:00
Temperature 98.1 F
Pulse Rate 96 H
Respiratory Rate 16
Blood Pressure 108/68
Pulse Oximetry 97
Oxygen Flow Rate 0



Oxygen Delivery Method         Room Air                                          
Oxygen Flow Rate               0                                                 



Const
General: No acute distress

Objective
Labs

24 04:50          

24 03:40          

Labs: 
Laboratory Results - last 24 hr

  24
  03:40
Sodium  138
Potassium  4.0
Chloride  108 H
Carbon Dioxide  27
BUN  4 L
Creatinine  0.52
Estimated GFR  > 60
BUN/Creatinine Ratio  7.7
Glucose  104 H
Calcium  9.0
Total Bilirubin  3.0 H
AST  282 H
ALT  513 H
Alkaline Phosphatase  153 H
Total Protein  6.9
Albumin  3.6
Globulin  3.3
Albumin/Globulin Ratio  1.1



PFSH
Medical History (Reviewed 24 @ 08:52 by Sal Guzman MD)

Anxiety (~)
Ankle pain (~)
Chlamydia (~)
Depression (~)
Ankle fracture, right (~)


Surgical History (Reviewed 24 @ 08:52 by Sal Guzman MD)

Anesthesia
[place] teeth extracted (~2021)
History of ankle surgery (~2020)


Family History (Reviewed 24 @ 08:52 by Sal Guzman MD)
Mother
 Diabetes mellitus
 Hyperlipidemia
 Depression
 Hypertension
Sister
 Ovarian cyst
 Depression
 Anxiety
Father
 Family estrangement
Grandmother
 Hypertension
 Hyperlipidemia
 Mental health problem


Social History (Reviewed 24 @ 08:52 by Sal Guzman MD)
marital status:   
number of children:  0 
household members:  spouse and family 
lives independently:  Yes 
caregiver/support person:  No 
housing:  apartment 
pets and animals:  Yes (dogs) 
education level:  high school 
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 
current occupational exposures/hazards:  No 
special gary needs:  No 
travel history:  over 6 months ago 
seatbelt use:  always 
helmet use:  No 
water heater temp set < 120 deg:  No 
working smoke detector in home:  Yes 
fire extinguisher in home:  Yes 
carbon monox detector in home:  Yes 
firearms in home:  Yes firearms unloaded and locked: Yes 
do you feel safe at home:  Yes 
Smoking Status:  Former smoker 
Tobacco: How many years used:  3 
second hand exposure:  No 
alcohol intake:  former 
substance use type:  does not use 
during the past year weight has:  increased > 10 lbs 
well-balanced diet:  rarely or never 
daily servings fruits/ve-1 
caffeine:  Yes (aware of 200mg limit) 
Type(s) of exercise:  none 



Assessment & Plan
Assessment and plan
(1) Choledocholithiasis: 
      Status: Acute

Plan
Transfer for ERCP when bed available

## 2024-03-09 NOTE — P.DS_ITS
"History of Present Illness    
History of Present Illness    
Chief complaint: sternum pain, no known injury    
Narrative:     
Kristen Serrato is a 23-year-old woman who presented to the emergency department last  
night complaining of epigastric abdominal pain.  An ultrasound showed   
cholelithiasis.  She had elevated liver enzymes and an MRCP was ordered which   
was suggestive but not definitive for choledocholithiasis.  A consultation with   
Gastroenterology at [facility] regarding ERCP was performed and the   
gastroenterologist reviewed the MRCP with their radiologist and determined that   
there was no evidence of choledocholithiasis.  She feels better now.    
    
Discharge Providers    
Provider    
Date of admission:     
03/08/24 16:30    
    
Discharge Date: 03/09/24    
Primary care physician:     
FRANDY Castro    
    
Discharge provider:     
Titi Gilmore MD    
    
    
Summary    
Hospital Course    
Discharge Diagnosis:     
choledocholithiasis    
Hospital Course:     
The patient was admitted for cholelithiasis.  Initially an MRCP was performed   
because of elevated liver enzymes.  The radiologist suspected   
choledocholithiasis and arrangements were made to transfer to  for ERCP   
however upon reviewing the imaging they did not believe the MRCP showed common   
duct stones.  She then went to the OR with Dr. Heller for a laparoscopic   
cholecystectomy with cholangiogram on 3/8/24.  No contrast flowed into the   
duodenum on cholangiogram so she was transferred for an ERCP to [place].    
    
Exam    
Vital Signs    
(past 8 hours):     
                                        -    
    
    
    
 03/09/24    
16:00 03/09/24    
20:02    
     
Temperature  97.1 F L    
     
Pulse Rate 80 85    
     
Respiratory Rate 16 18    
     
Blood Pressure 103/66 115/83    
     
Pulse Oximetry 97 96    
     
Oxygen Flow Rate 0 0    
    
    
                                            
    
    
    
Oxygen Delivery Method         Room Air                                           
    
     
Oxygen Flow Rate               0                                                  
    
    
    
    
    
    
Objective    
Labs    
    
                                                        03/07/24 04:50              
    
                                                        03/09/24 03:40              
    
Labs:     
                         Laboratory Results - last 24 hr    
    
    
    
  03/09/24    
    
  03:40    
     
Sodium  138    
     
Potassium  4.0    
     
Chloride  108 H    
     
Carbon Dioxide  27    
     
BUN  4 L    
     
Creatinine  0.52    
     
Estimated GFR  > 60    
     
BUN/Creatinine Ratio  7.7    
     
Glucose  104 H    
     
Calcium  9.0    
     
Total Bilirubin  3.0 H    
     
AST  282 H    
     
ALT  513 H    
     
Alkaline Phosphatase  153 H    
     
Total Protein  6.9    
     
Albumin  3.6    
     
Globulin  3.3    
     
Albumin/Globulin Ratio  1.1    
    
    
    
    
[facility]    
Medical History (Reviewed 03/06/24 @ 08:52 by Sal Guzman MD)    
    
Anxiety (~2021)    
Ankle pain (~2020)    
Chlamydia (~2019)    
Depression (~2021)    
Ankle fracture, right (~2020)    
    
    
Surgical History (Reviewed 03/06/24 @ 08:52 by Sal Guzman MD)    
    
Anesthesia    
[place] teeth extracted (~03/2021)    
History of ankle surgery (~09/2020)    
    
    
Family History (Reviewed 03/06/24 @ 08:52 by Sal Guzman MD)    
Mother   Diabetes mellitus    
   Hyperlipidemia    
   Depression    
   Hypertension    
Sister   Ovarian cyst    
   Depression    
   Anxiety    
Father   Family estrangement    
Grandmother   Hypertension    
   Hyperlipidemia    
   Mental health problem    
    
    
Social History (Reviewed 03/06/24 @ 08:52 by Sal Guzman MD)    
marital s
[id number]|2024-03-06 16:36:00|2024-03-06 16:36:00|P.HP_ITS|[facility]|Health Information Management|[id number]|"History of Present Illness